# Patient Record
Sex: MALE | Race: WHITE
[De-identification: names, ages, dates, MRNs, and addresses within clinical notes are randomized per-mention and may not be internally consistent; named-entity substitution may affect disease eponyms.]

---

## 2017-09-10 NOTE — ED
Psychiatric Complaint





<Melany Castillo - Last Filed: 09/11/17 13:15>





- HPI Summary


HPI Summary: 


Pt here w/ worsening anxiety. H/o anxiety and has been taking wellbutrin x 1 + 

years. 2 weeks ago, his dose was reduced due to worsening anxiety. He feels 

this is getting worse instead of better since reducing wellbutrin. No 

triggering factors he can identify - lives alone, enjoys school - senior, has 

plans for future employment opportunities, has friends support, etc. He's 

concerned as he has a h/o drinking too much water when he's anxious - has had 

medical complications in the past from this. Reports he's been unable to leave 

his apartment for the past 2 days and no appetite so hasn't eaten in 2 days. 

Reports he called his psychiatrist in [place] where he's from but due to being a 

weekend, came here. Father is also heading here to be with him. Denies SI/HI.





- History Of Current Complaint


Hx Obtained From: Patient





<Willow Hidalgo - Last Filed: 09/13/17 02:34>





- History Of Current Complaint


Chief Complaint: EDMentalHealth


Time Seen by Provider: 09/10/17 19:11





- Allergies/Home Medications


Allergies/Adverse Reactions: 


 Allergies











Allergy/AdvReac Type Severity Reaction Status Date / Time


 


No Known Allergies Allergy   Verified 12/13/15 15:59














PMH/Surg Hx/FS Hx/Imm Hx


Previously Healthy: Yes


Endocrine/Hematology History: 


   Denies: Hx Thyroid Disease, Hx Anemia


Respiratory History: 


   Denies: Hx Asthma


Psychiatric History: Reports: Hx Anxiety - takes wellbutrin





- Immunization History


Immunizations Up to Date: Yes


Infectious Disease History: No


Infectious Disease History: 


   Denies: Traveled Outside the [place] in Last 30 Days





- Family History


Known Family History: Positive: Other - uncle - schizophrenia





- Social History


Occupation: Student


Lives: Alone - at school, [facility]


Alcohol Use: Rare - haven't had anything in months


Hx Substance Use: No


Substance Use Type: Reports: None


Hx Tobacco Use: No


Smoking Status (MU): Former Smoker - hasn't smoked in "years" but had 1 

yesterday d/t anxiety





<Willow Hidalgo - Last Filed: 09/13/17 02:34>





Review of Systems


Constitutional: Negative


Negative: Fever, Chills, Fatigue


Cardiovascular: Negative


Negative: Palpitations, Chest Pain


Respiratory: Negative


Negative: Shortness Of Breath


Gastrointestinal: Negative


Negative: Abdominal Pain, Vomiting, Diarrhea, Nausea


Positive: see HPI


Musculoskeletal: Negative


Skin: Negative


Neurological: Negative


Positive: Anxious - see HPI - denies SI/HI


All Other Systems Reviewed And Are Negative: Yes





<Willow Hidalgo - Last Filed: 09/13/17 02:34>





Physical Exam


Vital Signs On Initial Exam: 


 Initial Vitals











Temp Pulse Resp BP Pulse Ox


 


 98.0 F   107   16   137/76   99 


 


 09/10/17 18:46  09/10/17 18:46  09/10/17 18:46  09/10/17 18:46  09/10/17 18:46














<Melany Castillo - Last Filed: 09/11/17 13:15>


Triage Information Reviewed: Yes


Vital Signs On Initial Exam: 


 Initial Vitals











Temp Pulse Resp BP Pulse Ox


 


 98.0 F   107   16   137/76   99 


 


 09/10/17 18:46  09/10/17 18:46  09/10/17 18:46  09/10/17 18:46  09/10/17 18:46











Vital Signs Reviewed: Yes


Appearance: Positive: Well-Appearing, No Pain Distress - difficulty initially 

with conversation - poor eye contact, rubs eyes and face at times, externally 

calm and cooperative but note some subtle irritation, Well-Nourished


Skin: Positive: Warm, Dry


Head/Face: Positive: Normal Head/Face Inspection


Eyes: Positive: EOMI


ENT: Positive: Hearing grossly normal


Neck: Positive: Nontender


Respiratory/Lung Sounds: Positive: Clear to Auscultation, Breath Sounds Present


Cardiovascular: Positive: Normal, RRR, S1, S2.  Negative: Murmur, Rub, Leg 

Edema Left, Leg Edema Right


Abdomen Description: Positive: Nontender, No Organomegaly, Soft


Bowel Sounds: Positive: Present


Musculoskeletal: Positive: Normal, Strength/ROM Intact


Neurological: Positive: Normal, Sensory/Motor Intact, Alert, Oriented to Person 

Place, Time, CN Intact II-III, Reflexes Intact


Psychiatric: Positive: Anxious - see above





- Jason Coma Scale


Coma Scale Total: 15





<Willow Hidalgo - Last Filed: 09/13/17 02:34>





Diagnostics





- Vital Signs


 Vital Signs











  Temp Pulse Resp BP Pulse Ox


 


 09/10/17 18:46  98.0 F  107  16  137/76  99














- Laboratory


Lab Results: 


 Lab Results











  09/10/17 09/10/17 09/10/17 Range/Units





  19:30 19:30 20:01 


 


WBC     (3.5-10.8)  10^3/ul


 


RBC     (4.0-5.4)  10^6/ul


 


Hgb     (14.0-18.0)  g/dl


 


Hct     (42-52)  %


 


MCV     (80-94)  fL


 


MCH     (27-31)  pg


 


MCHC     (31-36)  g/dl


 


RDW     (10.5-15)  %


 


Plt Count     (150-450)  10^3/ul


 


MPV     (7.4-10.4)  um3


 


Neut % (Auto)     (38-83)  %


 


Lymph % (Auto)     (25-47)  %


 


Mono % (Auto)     (1-9)  %


 


Eos % (Auto)     (0-6)  %


 


Baso % (Auto)     (0-2)  %


 


Absolute Neuts (auto)     (1.5-7.7)  10^3/ul


 


Absolute Lymphs (auto)     (1.0-4.8)  10^3/ul


 


Absolute Monos (auto)     (0-0.8)  10^3/ul


 


Absolute Eos (auto)     (0-0.6)  10^3/ul


 


Absolute Basos (auto)     (0-0.2)  10^3/ul


 


Absolute Nucleated RBC     10^3/ul


 


Nucleated RBC %     


 


Sodium    137  (133-145)  mmol/L


 


Potassium    3.4 L  (3.5-5.0)  mmol/L


 


Chloride    101  (101-111)  mmol/L


 


Carbon Dioxide    29  (22-32)  mmol/L


 


Anion Gap    7  (2-11)  mmol/L


 


BUN    9  (6-24)  mg/dL


 


Creatinine    1.01  (0.67-1.17)  mg/dL


 


Est GFR ( Amer)    117.7  (>60)  


 


Est GFR (Non-Af Amer)    91.5  (>60)  


 


BUN/Creatinine Ratio    8.9  (8-20)  


 


Glucose    139 H  ()  mg/dL


 


Calcium    10.1  (8.6-10.3)  mg/dL


 


Total Bilirubin    0.60  (0.2-1.0)  mg/dL


 


AST    14  (13-39)  U/L


 


ALT    16  (7-52)  U/L


 


Alkaline Phosphatase    74  ()  U/L


 


Total Protein    8.2  (6.4-8.9)  g/dL


 


Albumin    5.4 H  (3.2-5.2)  g/dL


 


Globulin    2.8  (2-4)  g/dL


 


Albumin/Globulin Ratio    1.9  (1-3)  


 


TSH    1.08  (0.34-5.60)  mcIU/mL


 


Urine Color   Colorless   


 


Urine Appearance   Clear   


 


Urine pH   6.0   (5-9)  


 


Ur Specific Gravity   1.000 L   (1.010-1.030)  


 


Urine Protein   Negative   (Negative)  


 


Urine Ketones   Negative   (Negative)  


 


Urine Blood   Negative   (Negative)  


 


Urine Nitrate   Negative   (Negative)  


 


Urine Bilirubin   Negative   (Negative)  


 


Urine Urobilinogen   Negative   (Negative)  


 


Ur Leukocyte Esterase   Negative   (Negative)  


 


Urine Glucose   Negative   (Negative)  


 


Salicylates    < 2.50  (<30)  mg/dL


 


Urine Opiates Screen  None detected    (None Detect)  


 


Acetaminophen    < 15  mcg/mL


 


Ur Barbiturates Screen  None detected    (None Detect)  


 


Ur Phencyclidine Scrn  None detected    (None Detect)  


 


Ur Amphetamines Screen  None detected    (None Detect)  


 


U Benzodiazepines Scrn  None detected    (None Detect)  


 


Urine Cocaine Screen  None detected    (None Detect)  


 


U Cannabinoids Screen  None detected    (None Detect)  


 


Serum Alcohol    < 10  (<10)  mg/dL














  09/10/17 Range/Units





  20:01 


 


WBC  10.2  (3.5-10.8)  10^3/ul


 


RBC  5.72 H  (4.0-5.4)  10^6/ul


 


Hgb  15.9  (14.0-18.0)  g/dl


 


Hct  47  (42-52)  %


 


MCV  82  (80-94)  fL


 


MCH  28  (27-31)  pg


 


MCHC  34  (31-36)  g/dl


 


RDW  14  (10.5-15)  %


 


Plt Count  239  (150-450)  10^3/ul


 


MPV  10  (7.4-10.4)  um3


 


Neut % (Auto)  74.2  (38-83)  %


 


Lymph % (Auto)  18.9 L  (25-47)  %


 


Mono % (Auto)  5.8  (1-9)  %


 


Eos % (Auto)  0.6  (0-6)  %


 


Baso % (Auto)  0.5  (0-2)  %


 


Absolute Neuts (auto)  7.6  (1.5-7.7)  10^3/ul


 


Absolute Lymphs (auto)  1.9  (1.0-4.8)  10^3/ul


 


Absolute Monos (auto)  0.6  (0-0.8)  10^3/ul


 


Absolute Eos (auto)  0.1  (0-0.6)  10^3/ul


 


Absolute Basos (auto)  0  (0-0.2)  10^3/ul


 


Absolute Nucleated RBC  0.02  10^3/ul


 


Nucleated RBC %  0.2  


 


Sodium   (133-145)  mmol/L


 


Potassium   (3.5-5.0)  mmol/L


 


Chloride   (101-111)  mmol/L


 


Carbon Dioxide   (22-32)  mmol/L


 


Anion Gap   (2-11)  mmol/L


 


BUN   (6-24)  mg/dL


 


Creatinine   (0.67-1.17)  mg/dL


 


Est GFR ( Amer)   (>60)  


 


Est GFR (Non-Af Amer)   (>60)  


 


BUN/Creatinine Ratio   (8-20)  


 


Glucose   ()  mg/dL


 


Calcium   (8.6-10.3)  mg/dL


 


Total Bilirubin   (0.2-1.0)  mg/dL


 


AST   (13-39)  U/L


 


ALT   (7-52)  U/L


 


Alkaline Phosphatase   ()  U/L


 


Total Protein   (6.4-8.9)  g/dL


 


Albumin   (3.2-5.2)  g/dL


 


Globulin   (2-4)  g/dL


 


Albumin/Globulin Ratio   (1-3)  


 


TSH   (0.34-5.60)  mcIU/mL


 


Urine Color   


 


Urine Appearance   


 


Urine pH   (5-9)  


 


Ur Specific Gravity   (1.010-1.030)  


 


Urine Protein   (Negative)  


 


Urine Ketones   (Negative)  


 


Urine Blood   (Negative)  


 


Urine Nitrate   (Negative)  


 


Urine Bilirubin   (Negative)  


 


Urine Urobilinogen   (Negative)  


 


Ur Leukocyte Esterase   (Negative)  


 


Urine Glucose   (Negative)  


 


Salicylates   (<30)  mg/dL


 


Urine Opiates Screen   (None Detect)  


 


Acetaminophen   mcg/mL


 


Ur Barbiturates Screen   (None Detect)  


 


Ur Phencyclidine Scrn   (None Detect)  


 


Ur Amphetamines Screen   (None Detect)  


 


U Benzodiazepines Scrn   (None Detect)  


 


Urine Cocaine Screen   (None Detect)  


 


U Cannabinoids Screen   (None Detect)  


 


Serum Alcohol   (<10)  mg/dL











Result Diagrams: 


 09/10/17 20:01





 09/10/17 20:01


Lab Statement: Any lab studies that have been ordered have been reviewed, and 

results considered in the medical decision making process.





<Melany Castillo - Last Filed: 09/11/17 13:15>





- Vital Signs


 Vital Signs











  Temp Pulse Resp BP Pulse Ox


 


 09/10/17 18:46  98.0 F  107  16  137/76  99














- Laboratory


Result Diagrams: 


 09/10/17 20:01





 09/10/17 20:01


Lab Statement: Any lab studies that have been ordered have been reviewed, and 

results considered in the medical decision making process.





<Willow Hidalgo - Last Filed: 09/13/17 02:34>





Course/Dx





<Melany Castillo - Last Filed: 09/11/17 13:15>





- Course


Course Of Treatment: Pt presents w/ acute on chronic anxiety per pt's report. 

Concern for overhydration as he drinks lots of water when he's nervous. Labs 

are unremarkable for excessive dilution and pt was able to eat applesauce while 

here. He denies SI/HI and his father is on his way to [facility]. Pt would like to 

try hydroxyzine as he's had this in the past w/o difficulty. Since he drove 

himself here, will provide one to go and he will  remaining meds at 

pharmacy tomorrow. MH evaluation cleared him for d/c - see notes for details. 

Reviewed w/ pt if he develops SI/HI or doesn't feel safe to return to ED. 

Otherwise, he will plan to f/u w/ MH outpt through [facility]





<Willow Hidalgo - Last Filed: 09/13/17 02:34>





- Differential Dx/Clinical Impression


Provider Diagnosis: 


 Anxiety








Discharge





<Melany Castillo - Last Filed: 09/11/17 13:15>





<Willow Hidalgo - Last Filed: 09/13/17 02:34>





- Discharge Plan


Condition: Stable


Disposition: HOME


Prescriptions: 


hydrOXYzine HCL TAB* [Atarax TAB 50 MG *] 50 mg PO TID PRN #15 tab


 PRN Reason: Anxiety


Patient Education Materials:  Depression (ED), Social Anxiety Disorder (ED)


Referrals: 


[facility] [Outside]


Additional Instructions: 


Follow-up with [facility] as directed


You may also inquire with [facility] for counseling, etc


Avoid triggers such as caffeine, nicotine, alcohol, etc. 


You may take hydroxyzine as needed (see instructions for directions)


*If you develop worsening of anxiety and/or suicidal or homicidal ideations, 

return to ED

## 2018-09-05 ENCOUNTER — HOSPITAL ENCOUNTER (EMERGENCY)
Dept: HOSPITAL 25 - UCEAST | Age: 23
Discharge: HOME | End: 2018-09-05
Payer: COMMERCIAL

## 2018-09-05 VITALS — SYSTOLIC BLOOD PRESSURE: 137 MMHG | DIASTOLIC BLOOD PRESSURE: 78 MMHG

## 2018-09-05 DIAGNOSIS — J06.9: Primary | ICD-10-CM

## 2018-09-05 DIAGNOSIS — J02.9: ICD-10-CM

## 2018-09-05 PROCEDURE — 99212 OFFICE O/P EST SF 10 MIN: CPT

## 2018-09-05 PROCEDURE — G0463 HOSPITAL OUTPT CLINIC VISIT: HCPCS

## 2018-09-05 PROCEDURE — 87651 STREP A DNA AMP PROBE: CPT

## 2018-09-05 NOTE — UC
Throat Pain/Nasal Yogi HPI





- HPI Summary


HPI Summary: 


A 21 y/o male presents to [facility] c/o sore throat and fever since yesterday 

morning. Additional symptoms include nausea, cough, congestion and generalized 

sickness. According to the patient, yesterday there was a "little incident" 

where he was hauling a dead corpse (for work, [employer]) off to the [facility]. He 

noted that the corpse was "pretty rip". He had a bit of contact with his shoes 

and the corpse of leaking and there was maggots everywhere. No PMHx. FHx is non-

contributory. SHx of no smoking or ETOH and is also a . No 

medications. 














- History of Current Complaint


Chief Complaint: UCRespiratory


Stated Complaint: FEVER,NAUSEA,ST,COUGH


Time Seen by Provider: 09/05/18 19:39


Hx Obtained From: Patient


Onset/Duration: Sudden Onset, Lasting Days, Still Present


Severity: Mild


Pain Intensity: 3


Pain Scale Used: 0-10 Numeric


Cough: Nonproductive


Associated Signs & Symptoms: Positive: Nasal Discharge, Fever





- Allergies/Home Medications


Allergies/Adverse Reactions: 


 Allergies











Allergy/AdvReac Type Severity Reaction Status Date / Time


 


No Known Allergies Allergy   Verified 09/05/18 19:33











Home Medications: 


 Home Medications





Acetaminophen TAB* [Tylenol TAB*] 1,000 mg PO ONCE 09/05/18 [History Confirmed 

09/05/18]


Ibuprofen TAB* [Motrin TAB* 400 MG] 400 mg PO TID 09/05/18 [History Confirmed 09 /05/18]











PMH/Surg Hx/FS Hx/Imm Hx





- Additional Past Medical History


Additional PMH: 


No PMHx





- Surgical History


Surgical History: None





- Family History


Known Family History: Positive: None - Non-contributory, Other - uncle - 

schizophrenia





- Social History


Alcohol Use: None


Substance Use Type: None


Smoking Status (MU): Never Smoked Tobacco





Review of Systems


Constitutional: Fever, Other - POSITIVE: Generalized sickness


Skin: Negative


Eyes: Negative


ENT: Negative


Respiratory: Cough, Other - POSITIVE: Congestion


Cardiovascular: Negative


Gastrointestinal: Nausea


Genitourinary: Negative


Motor: Negative


Neurovascular: Negative


Musculoskeletal: Negative


Neurological: Negative


Psychological: Negative


Is Patient Immunocompromised?: No


All Other Systems Reviewed And Are Negative: Yes





Physical Exam





- Summary


Physical Exam Summary: 


Appearance: Well appearing, no pain distress


Skin: warm, dry, reflects adequate perfusion


Head/face: normal


Eyes: EOMI, LEA


ENT: normal, clear nasal discharge, no tonsil enlargement or exudate. No clear 

discharge in back of throat.


Neck: supple, non-tender


Respiratory: CTA, breath sounds present


Cardiovascular: RRR, pulses symmetrical 


Abdomen: non-tender, soft


Bowel Sounds: present


Musculoskeletal: normal, strength/ROM intact


Neuro: normal, sensory motor intact, A&Ox3





Triage Information Reviewed: Yes


Vital Signs: 


 Initial Vital Signs











Temp  98.3 F   09/05/18 19:30


 


Pulse  96   09/05/18 19:30


 


Resp  18   09/05/18 19:30


 


BP  137/78   09/05/18 19:30


 


Pulse Ox  99   09/05/18 19:30











Vital Signs Reviewed: Yes





Throat Pain/Nasal Course/Dx





- Course


Course Of Treatment: Patient presents with URI symptoms and also concern for 

exposure after moving a corpse while at work.  He got some fluids on his arm 

which she washed off.  This should not be any concern.  His rapid strep is 

negative and he will be treated symptomatically for upper respiratory infection.





- Differential Dx/Diagnosis


Provider Diagnoses: URI and pharyngitis.





Discharge





- Sign-Out/Discharge


Documenting (check all that apply): Patient Departure - DISCHARGE


All imaging exams completed and their final reports reviewed: No Studies





- Discharge Plan


Condition: Improved


Disposition: HOME


Prescriptions: 


Guaifenesin/Pseudo 600/60(NF) [Mucinex D 600/60 (NF)] 1 tab PO BID #10 tab


predniSONE TAB* [Deltasone TAB*] 50 mg PO DAILY #4 tab


Patient Education Materials:  Pharyngitis (ED)


Referrals: 


[facility] [Outside]


[facility] PHYSICIAN REFERRAL [Outside]


Additional Instructions: 


Stay well-hydrated.  Tylenol, ibuprofen as needed for comfort.  Return if worse

, high fever, difficulty breathing, new symptoms or other concerns.





- Billing Disposition and Condition


Condition: IMPROVED


Disposition: Home





- Attestation Statements


Document Initiated by Scribe: Yes


Documenting Scribe: Larry Riggs


Provider For Whom Scribe is Documenting (Include Credential): Zaheer Ram MD


Scribe Attestation: 


Larry WILBURN, scribed for Zaheer Ram MD on 09/05/18 at 2050. 


Scribe Documentation Reviewed: Yes


Provider Attestation: 


The documentation as recorded by the scribe, Larry Riggs accurately reflects 

the service I personally performed and the decisions made by me, Zaheer Ram MD

## 2019-10-20 ENCOUNTER — HOSPITAL ENCOUNTER (EMERGENCY)
Dept: HOSPITAL 25 - UCEAST | Age: 24
Discharge: HOME | End: 2019-10-20
Payer: COMMERCIAL

## 2019-10-20 VITALS — DIASTOLIC BLOOD PRESSURE: 88 MMHG | SYSTOLIC BLOOD PRESSURE: 134 MMHG

## 2019-10-20 DIAGNOSIS — F90.9: ICD-10-CM

## 2019-10-20 DIAGNOSIS — R35.0: Primary | ICD-10-CM

## 2019-10-20 PROCEDURE — 81003 URINALYSIS AUTO W/O SCOPE: CPT

## 2019-10-20 PROCEDURE — 99211 OFF/OP EST MAY X REQ PHY/QHP: CPT

## 2019-10-20 PROCEDURE — G0463 HOSPITAL OUTPT CLINIC VISIT: HCPCS

## 2019-10-20 PROCEDURE — 87491 CHLMYD TRACH DNA AMP PROBE: CPT

## 2019-10-20 PROCEDURE — 87591 N.GONORRHOEAE DNA AMP PROB: CPT

## 2019-10-20 PROCEDURE — 87086 URINE CULTURE/COLONY COUNT: CPT

## 2019-10-20 NOTE — UC
Complaint Male HPI





- HPI Summary


HPI Summary: 





patient c/o urinary frequency without pain or burning for 3 days





- History of Current Complaint


Chief Complaint: UCGU


Stated Complaint: URINARY COMPLAINT


Time Seen by Provider: 10/20/19 21:36


Hx Obtained From: Patient


Onset/Duration: Gradual Onset, Lasting Days - 3, Still Present


Timing: Constant


Pain Intensity: 2


Pain Scale Used: 0-10 Numeric


Location: Suprapubic


Aggravating Factor(s): Nothing


Alleviating Factor(s): Nothing


Associated Signs And Symptoms: Positive: Negative





- Allergies/Home Medications


Allergies/Adverse Reactions: 


 Allergies











Allergy/AdvReac Type Severity Reaction Status Date / Time


 


No Known Allergies Allergy   Verified 09/05/18 19:33











Home Medications: 


 Home Medications





Methylphenidate HCl [Methylphenidate ER (LA)]  10/20/19 [History]











PMH/Surg Hx/FS Hx/Imm Hx


Previously Healthy: No


Psychological History: Other - Adhd





- Surgical History


Surgical History: None





- Family History


Known Family History: Positive: None - Non-contributory, Other - uncle - 

schizophrenia





- Social History


Occupation: Employed Full-time


Lives: With Family


Alcohol Use: None


Substance Use Type: None


Smoking Status (MU): Never Smoked Tobacco





Review of Systems


All Other Systems Reviewed And Are Negative: Yes


Constitutional: Positive: Negative


Skin: Positive: Negative


Eyes: Positive: Negative


ENT: Positive: Negative


Respiratory: Positive: Negative


Cardiovascular: Positive: Negative


Gastrointestinal: Positive: Negative


Genitourinary: Positive: Frequency


Motor: Positive: Negative


Neurovascular: Positive: Negative


Musculoskeletal: Positive: Negative


Neurological: Positive: Negative


Psychological: Positive: Negative


Is Patient Immunocompromised?: No





Physical Exam


Triage Information Reviewed: Yes


Appearance: Well-Appearing, No Pain Distress, Well-Nourished


Vital Signs: 


 Initial Vital Signs











Temp  98.7 F   10/20/19 21:06


 


Pulse  80   10/20/19 21:06


 


Resp  16   10/20/19 21:06


 


BP  134/88   10/20/19 21:06


 


Pulse Ox  100   10/20/19 21:06











Vital Signs Reviewed: Yes


Eye Exam: Normal


Eyes: Positive: Conjunctiva Clear


ENT Exam: Normal


ENT: Positive: Normal ENT inspection, Hearing grossly normal.  Negative: Trismus

, Muffled voice, Hoarse voice


Dental Exam: Normal


Neck exam: Normal


Neck: Positive: Supple, Nontender, No Lymphadenopathy


Respiratory Exam: Normal


Respiratory: Positive: Chest non-tender, No respiratory distress, No accessory 

muscle use


Cardiovascular Exam: Normal


Cardiovascular: Positive: RRR, Pulses Normal, Brisk Capillary Refill


Abdominal Exam: Normal


Abdomen Description: Positive: Nontender, No Organomegaly, Soft.  Negative: CVA 

Tenderness (R), CVA Tenderness (L)


Musculoskeletal Exam: Normal


Musculoskeletal: Positive: Strength Intact, ROM Intact, No Edema


Neurological Exam: Normal


Neurological: Positive: Alert, Muscle Tone Normal


Psychological Exam: Normal


Skin Exam: Normal





Diagnostics





- Laboratory


Lab Results: 





ua-no evidence of UTI





 Complaint Male Course/Dx





- Course


Course Of Treatment: 





increase fluids, follow with [facility] clinic in 3 days, culture urine





- Differential Dx/Diagnosis


Provider Diagnosis: 


 Frequency of urination








Discharge ED





- Sign-Out/Discharge


Documenting (check all that apply): Patient Departure


All imaging exams completed and their final reports reviewed: No Studies





- Discharge Plan


Condition: Stable


Disposition: HOME


Patient Education Materials:  Dysuria (ED)


Referrals: 


[facility] [Outside] - 3 Days





- Billing Disposition and Condition


Condition: STABLE


Disposition: Home





- Attestation Statements


Provider Attestation: 


I was available for consult. This patient was seen by the JUDSON. The patient was 

not presented to , seen by or examined by me -Susan Huggins MD

## 2019-11-21 ENCOUNTER — HOSPITAL ENCOUNTER (EMERGENCY)
Dept: HOSPITAL 25 - UCEAST | Age: 24
Discharge: HOME | End: 2019-11-21
Payer: COMMERCIAL

## 2019-11-21 VITALS — SYSTOLIC BLOOD PRESSURE: 136 MMHG | DIASTOLIC BLOOD PRESSURE: 65 MMHG

## 2019-11-21 DIAGNOSIS — R10.13: ICD-10-CM

## 2019-11-21 DIAGNOSIS — R19.7: Primary | ICD-10-CM

## 2019-11-21 DIAGNOSIS — R53.83: ICD-10-CM

## 2019-11-21 DIAGNOSIS — R11.2: ICD-10-CM

## 2019-11-21 PROCEDURE — 99212 OFFICE O/P EST SF 10 MIN: CPT

## 2019-11-21 PROCEDURE — G0463 HOSPITAL OUTPT CLINIC VISIT: HCPCS

## 2019-11-21 NOTE — UC
Abdominal Pain Male HPI





- HPI Summary


HPI Summary: 


Patient is a 23-year-old male presenting with diarrhea 3 days and nausea and 

vomiting that began this morning.  Notes 2-3 episodes of diarrhea daily.  4-5 

episodes of vomiting today.  Notes abdominal "discomfort" but denies "pain."  

Denies radiation of abdominal discomfort. Notes continuous nausea. Stool is 

looser.  Denies hematochezia.  Denies hematemesis.  Notes decreased appetite 

today but is still able to drink water.  Denies fever and chills.  Denies 

headaches and body aches.  Denies shortness of breath and chest pain. 








- History of Current Complaint


Chief Complaint: UCGI


Stated Complaint: GI ISSUE, VOMITING


Hx Obtained From: Patient


Onset/Duration: Sudden Onset, Lasting Days


Severity Currently: Mild


Pain Intensity: 3


Pain Scale Used: 0-10 Numeric





- Allergies/Home Medications


Allergies/Adverse Reactions: 


 Allergies











Allergy/AdvReac Type Severity Reaction Status Date / Time


 


No Known Allergies Allergy   Verified 09/05/18 19:33














PMH/Surg Hx/FS Hx/Imm Hx


Previously Healthy: Yes





- Surgical History


Surgical History: None





- Family History


Known Family History: Positive: None - Non-contributory, Other - uncle - 

schizophrenia





- Social History


Occupation: Student


Alcohol Use: None


Substance Use Type: None


Smoking Status (MU): Never Smoked Tobacco





Review of Systems


All Other Systems Reviewed And Are Negative: Yes


Constitutional: Positive: Fatigue.  Negative: Fever, Chills


ENT: Positive: Negative


Respiratory: Positive: Negative.  Negative: Shortness Of Breath


Cardiovascular: Positive: Negative.  Negative: Chest Pain


Gastrointestinal: Positive: Abdominal Pain - epigastric discomfort, Vomiting, 

Diarrhea, Nausea





Physical Exam


Triage Information Reviewed: Yes


Appearance: Well-Appearing, No Pain Distress, Well-Nourished


Vital Signs: 


 Initial Vital Signs











Temp  98 F   11/21/19 17:53


 


Pulse  88   11/21/19 17:53


 


Resp  16   11/21/19 17:53


 


BP  136/65   11/21/19 17:53


 


Pulse Ox  99   11/21/19 17:53











Vital Signs Reviewed: Yes


Eyes: Positive: Conjunctiva Clear


ENT: Positive: Normal ENT inspection, Hearing grossly normal, Pharynx normal, 

TMs normal, [place] midline, Other - moist oromucosa


Neck exam: Normal


Neck: Positive: Supple, Nontender, No Lymphadenopathy


Respiratory Exam: Normal


Respiratory: Positive: Lungs clear, Normal breath sounds, No respiratory 

distress


Cardiovascular Exam: Normal


Cardiovascular: Positive: RRR


Abdominal Exam: Normal


Abdomen Description: Positive: Nontender, No Organomegaly, Soft.  Negative: CVA 

Tenderness (R), CVA Tenderness (L), Distended, Guarding, [place]'s [place] 

Tenderness


Bowel Sounds: Positive: Present


Neurological: Positive: Alert


Psychological: Positive: Age Appropriate Behavior


Skin Exam: Normal





Abd Pain Male Course/Dx





- Course


Course Of Treatment: 


Discussed likely viral etiology or food borne illness as cause of acute nausea, 

vomiting, diarrhea.  Instructed to maintain hydration and stick to bland diet.  

I prescribed Zofran for treatment of nausea.  Instructed to return or go to ED 

if symptoms persist or worsen.  Patient voiced understanding and agreed with 

treatment plan.








- Differential Dx/Clinical Impression


Provider Diagnosis: 


 Nausea & vomiting, Diarrhea








Discharge ED





- Sign-Out/Discharge


Documenting (check all that apply): Patient Departure


All imaging exams completed and their final reports reviewed: No Studies





- Discharge Plan


Condition: Stable


Disposition: HOME


Prescriptions: 


Ondansetron ODT TAB* [Zofran 4 MG Odt TAB*] 4 mg SL Q6H PRN #12 tab.odt


 PRN Reason: Nausea/Vomiting


Ondansetron ODT TAB* [Zofran 4 MG Odt TAB*] 4 mg SL Q6H PRN #12 tab.odt


 PRN Reason: Nausea/Vomiting


Patient Education Materials:  Acute Nausea and Vomiting (ED), Acute Diarrhea (ED

)


Forms:  *School Release


Referrals: 


Bridgette Motta MD [Primary Care Provider] - If Needed


Additional Instructions: 


As discussed, take the Zofran as prescribed for your nausea and vomiting.


Increase your fiber intake to help relieve diarrhea.


Get plenty of rest and increase your fluid intake.


Eat a bland diet, such as bread, bananas, and rice while symptoms are present.


Go to the emergency room if you develop fever, excessive vomiting, or are 

unable to keep fluids down.








- Billing Disposition and Condition


Condition: STABLE


Disposition: Home